# Patient Record
Sex: FEMALE | Race: WHITE
[De-identification: names, ages, dates, MRNs, and addresses within clinical notes are randomized per-mention and may not be internally consistent; named-entity substitution may affect disease eponyms.]

---

## 2019-04-22 ENCOUNTER — HOSPITAL ENCOUNTER (EMERGENCY)
Dept: HOSPITAL 15 - ER | Age: 64
Discharge: HOME | End: 2019-04-22
Payer: COMMERCIAL

## 2019-04-22 VITALS — DIASTOLIC BLOOD PRESSURE: 63 MMHG | SYSTOLIC BLOOD PRESSURE: 125 MMHG

## 2019-04-22 VITALS — BODY MASS INDEX: 20.32 KG/M2 | WEIGHT: 119 LBS | HEIGHT: 64 IN

## 2019-04-22 DIAGNOSIS — N20.0: Primary | ICD-10-CM

## 2019-04-22 LAB
ALBUMIN SERPL-MCNC: 3.6 G/DL (ref 3.4–5)
ALP SERPL-CCNC: 92 U/L (ref 45–117)
ALT SERPL-CCNC: 23 U/L (ref 13–56)
ANION GAP SERPL CALCULATED.3IONS-SCNC: 10 MMOL/L (ref 5–15)
BILIRUB SERPL-MCNC: 0.1 MG/DL (ref 0.2–1)
BUN SERPL-MCNC: 22 MG/DL (ref 7–18)
BUN/CREAT SERPL: 26.5
CALCIUM SERPL-MCNC: 8.5 MG/DL (ref 8.5–10.1)
CHLORIDE SERPL-SCNC: 110 MMOL/L (ref 98–107)
CO2 SERPL-SCNC: 22 MMOL/L (ref 21–32)
GLUCOSE SERPL-MCNC: 103 MG/DL (ref 74–106)
HCT VFR BLD AUTO: 35.7 % (ref 36–46)
HGB BLD-MCNC: 12 G/DL (ref 12.2–16.2)
MCH RBC QN AUTO: 30.4 PG (ref 28–32)
MCV RBC AUTO: 90.6 FL (ref 80–100)
NRBC BLD QL AUTO: 0 %
POTASSIUM SERPL-SCNC: 3.9 MMOL/L (ref 3.5–5.1)
PROT SERPL-MCNC: 6.3 G/DL (ref 6.4–8.2)
SODIUM SERPL-SCNC: 142 MMOL/L (ref 136–145)

## 2019-04-22 PROCEDURE — 96374 THER/PROPH/DIAG INJ IV PUSH: CPT

## 2019-04-22 PROCEDURE — 85025 COMPLETE CBC W/AUTO DIFF WBC: CPT

## 2019-04-22 PROCEDURE — 96361 HYDRATE IV INFUSION ADD-ON: CPT

## 2019-04-22 PROCEDURE — 96375 TX/PRO/DX INJ NEW DRUG ADDON: CPT

## 2019-04-22 PROCEDURE — 74176 CT ABD & PELVIS W/O CONTRAST: CPT

## 2019-04-22 PROCEDURE — 81001 URINALYSIS AUTO W/SCOPE: CPT

## 2019-04-22 PROCEDURE — 99284 EMERGENCY DEPT VISIT MOD MDM: CPT

## 2019-04-22 PROCEDURE — 80053 COMPREHEN METABOLIC PANEL: CPT

## 2019-04-22 PROCEDURE — 36415 COLL VENOUS BLD VENIPUNCTURE: CPT

## 2024-12-30 ENCOUNTER — HOSPITAL ENCOUNTER (EMERGENCY)
Dept: HOSPITAL 15 - ER | Age: 69
Discharge: LEFT BEFORE DISCHARGE | End: 2024-12-30
Payer: COMMERCIAL

## 2024-12-30 VITALS — HEIGHT: 64 IN | BODY MASS INDEX: 26.35 KG/M2 | WEIGHT: 154.32 LBS

## 2024-12-30 VITALS — RESPIRATION RATE: 17 BRPM | OXYGEN SATURATION: 97 %

## 2024-12-30 VITALS — HEART RATE: 56 BPM

## 2024-12-30 VITALS — DIASTOLIC BLOOD PRESSURE: 54 MMHG | SYSTOLIC BLOOD PRESSURE: 75 MMHG | TEMPERATURE: 98 F

## 2024-12-30 DIAGNOSIS — E03.9: ICD-10-CM

## 2024-12-30 DIAGNOSIS — K29.70: Primary | ICD-10-CM

## 2024-12-30 LAB
ANION GAP SERPL CALCULATED.3IONS-SCNC: 8 MMOL/L (ref 5–15)
BUN SERPL-MCNC: 18 MG/DL (ref 9–23)
BUN/CREAT SERPL: 17.8 (ref 10–20)
CALCIUM SERPL-MCNC: 10.3 MG/DL (ref 8.7–10.4)
CHLORIDE SERPL-SCNC: 107 MMOL/L (ref 98–107)
CO2 SERPL-SCNC: 27 MMOL/L (ref 20–31)
GLUCOSE SERPL-MCNC: 116 MG/DL (ref 74–106)
HCT VFR BLD AUTO: 41.7 % (ref 36–46)
HGB BLD-MCNC: 14.2 G/DL (ref 12.2–16.2)
MCH RBC QN AUTO: 31.2 PG (ref 28–32)
MCV RBC AUTO: 91.8 FL (ref 80–100)
NRBC BLD QL AUTO: 0 %
POTASSIUM SERPL-SCNC: 3.5 MMOL/L (ref 3.5–5.1)
SODIUM SERPL-SCNC: 142 MMOL/L (ref 136–145)

## 2024-12-30 PROCEDURE — 36415 COLL VENOUS BLD VENIPUNCTURE: CPT

## 2024-12-30 PROCEDURE — 80048 BASIC METABOLIC PNL TOTAL CA: CPT

## 2024-12-30 PROCEDURE — 85025 COMPLETE CBC W/AUTO DIFF WBC: CPT

## 2024-12-30 PROCEDURE — 93005 ELECTROCARDIOGRAM TRACING: CPT

## 2024-12-30 NOTE — ECG
[facility]

                                       

Test Date:    2024               Test Time:    08:34:55

Pat Name:     ROMAN KEITH            Department:   ER

Patient ID:   [id number]           Room:          

Gender:       F                        Technician:   GP

:          1955               Requested By: DARRIAN ULRICH

Order Number: [id number]        Reading MD:   Naila Obando

                                 Measurements

Intervals                              Axis          

Rate:         56                       P:            81

IN:           147                      QRS:          68

QRSD:         98                       T:            68

QT:           434                                    

QTc:          419                                    

                           Interpretive Statements

Sinus rhythm

RSR' in V1 or V2, right VCD or RVH

Electronically Signed On 2024 9:05:58 PST by Naila Obando



Please click the below link to view image of tracing.

## 2024-12-30 NOTE — ED.PDOC
History of Present Illness


HPI Comments


70 y/o F, with a Hx of cholelithiasis and thyroid disease, is BIBA for c/o 

abdominal pain, nausea, and vomiting, today. Patient endorses on sudden and 

unprovoked onset of 7/10 pain with other symptoms at 0200, this morning. She 

reports another isolated episode of symptoms that resolved on its own 1x week 

ago and states on suspicion on her "gallstones" acting up, again. She denies any

diarrhea, hematemesis, fever, chills, or other associated symptoms or modifiers 

at this time.


Chief Complaint:  Abdominal Pain


Time Seen by MD:  10:45


Primary Care Provider:  JOSE HINOJOSA


Reviewed Notes:  Nurses Notes, Paramedic Notes, Medications, Allergies


Allergies:  


Coded Allergies:  


     NO KNOWN ALLERGIES (Unverified , 4/22/19)


Information Source:  Patient, Emergency Med Personnel


Mode of Arrival:  EMS


Severity:  Moderate


Duration:  Since onset


Prehospital treatment:  12 Lead EKG, Cardiac Monitor





Past Medical History


PAST MEDICAL HISTORY:  Anxiety, Depression, Gallstones, Thyroid


Surgical History:  Denies all surgeries


GYN History:  No Pertinent GYN History





Family History


Family History:  Unknown





Social History


Smoker:  Non-Smoker


Alcohol:  Denies ETOH Use


Drugs:  Denies Drug Use


Lives In:  Home





Gastrointestinal:  reports: abdominal pain, nausea, vomiting


All Other Systems:  Reviewed and Negative (negative unless otherwise stated 

above or in HPI)





Physical Exam


General Appearance:  Moderate Distress


HEENT:  Normal ENT Inspection, Pharynx Normal, TMs Normal


Neck:  Full Range of Motion, Non-Tender, Normal, Normal Inspection


Respiratory:  Chest Non-Tender, Lungs Clear, No Accessory Muscle Use, No 

Respiratory Distress, Normal Breath Sounds


Cardiovascular:  No Edema, No JVD, No Murmur, No Gallop, Normal Peripheral 

Pulses, Regular Rate/Rhythm


Breast Exam:  Deferred


Gastrointestinal:  No Organomegaly, Non Tender, No Pulsatile Mass, Normal Bowel 

Sounds, Soft


Genitalia:  Deferred


Pelvic:  Deferred


Rectal:  Deferred


Extremities:  No calf tenderness, Normal capillary refill, Normal inspection, 

Normal range of motion, Non-tender, No pedal edema


Musculoskeletal :  


   Apperance:  Normal


Neurologic:  Alert, CNs II-XII nml as Tested, No Motor Deficits, Normal Affect, 

Normal Mood, No Sensory Deficits


Cerebellar Function:  Normal


Reflexes:  Normal


Skin:  Dry, Normal Color, Warm


Peripheral Pulses:  3+ Radial (R), 3+ Radial (L)


Lymphatic:  No Adenopathy





Was a procedure done?


Was a procedure done?:  No





EKG


EKG :  


   Pulse Rate (adult):  56


   Axis:  Normal


   Cardiac Rhythm:  NSR


   Block:  None


   Hypertrophy:  None


   ST:  Normal





Differential Dx


Considerations may include:


cholelithiasis, cholecystitis, gastritis, gastroenteritis, spoiled food, viral 

syndrome, acute abdomen





X-Ray, Labs, Meds, VS





                                   Vital Signs








  Date Time  Temp Pulse Resp B/P (MAP) Pulse Ox O2 Delivery O2 Flow Rate FiO2


 


12/30/24 09:35  56      


 


12/30/24 09:21   17  97 Room Air* 0 21


 


12/30/24 09:18 98.0 76 17 75/54 (61) 97   





 98.0       


 


12/30/24 08:34  56      


 


12/30/24 08:30 98.0 62 14 105/73 (84) 100   








                                       Lab








Test


 12/30/24


08:50 Range/Units


 


 


White Blood Count


 9.7 


 4.4-10.8


10^3/uL


 


Red Blood Count


 4.54 


 4.0-5.20


10^6/uL


 


Hemoglobin 14.2  12.2-16.2  g/dL


 


Hematocrit 41.7  36.0-46.0  %


 


Mean Corpuscular Volume 91.8  80.0-100.0  fL


 


Mean Corpuscular Hemoglobin 31.2  28.0-32.0  pg


 


Mean Corpuscular Hemoglobin


Concent 34.0 


 32.0-36.0  g/dL





 


Red Cell Distribution Width 12.7  11.8-14.3  %


 


Platelet Count


 274 


 140-450


10^3/uL


 


Mean Platelet Volume 8.1  6.9-10.8  fL


 


Neutrophils (%) (Auto) 84.9 H 37.0-80.0  %


 


Lymphocytes (%) (Auto) 8.4 L 10.0-50.0  %


 


Monocytes (%) (Auto) 5.2  0.0-12.0  %


 


Eosinophils (%) (Auto) 1.3  0.0-7.0  %


 


Basophils (%) (Auto) 0.2  0.0-2.0  %


 


Neutrophils # (Auto)


 8.3 


 1.6-8.6  10


^3/uL


 


Lymphocytes # (Auto)


 0.8 


 0.4-5.4  10


^3/uL


 


Monocytes # (Auto) 0.5  0-1.3  10 ^3/uL


 


Eosinophils # (Auto) 0.1  0-0.8  10 ^3/uL


 


Basophils # (Auto) 0  0-0.2  10 ^3/uL


 


Nucleated Red Blood Cells 0.0   %


 


Sodium Level 142  136-145  mmol/L


 


Potassium Level 3.5  3.5-5.1  mmol/L


 


Chloride Level 107    mmol/L


 


Carbon Dioxide Level 27  20-31  mmol/L


 


Anion Gap 8  5-15  


 


Blood Urea Nitrogen 18  9-23  mg/dL


 


Creatinine


 1.01 


 0.550-1.02


mg/dL


 


Glomerular Filtration Rate


Calc 60 


 >90  mL/min





 


BUN/Creatinine Ratio 17.8  10.0-20.0  


 


Serum Glucose 116 H   mg/dL


 


Calcium Level 10.3  8.7-10.4  mg/dL





Patient alert.  


Complaining of abdominal discomfort.  


Vitals stable.  


Answering all questions.


WBC within normal limits.  


Hemoglobin within normal limits.  


Establish intravenous access.  


Was given fluids.


Pink color.  


No sign of distress.  


Was given Zofran.  


Reviewed her history.  


Explained to the patient that she will possibly need gallbladder workup.  


Was told to follow up with her primary care physician.  


Was told to come back if there is any problem.


Time of 1ST Reevaluation:  11:15


Reevaluation 1ST:  Improved


Patient Education/Counseling:  Diagnosis, Treatment


Family Education/Counseling:  No Family Present


Additional Information


I reviewed the following notes from patient's past medical encounters: ER 

physician documentation on 04/24/2019





The following tests were ordered, and results were reviewed by me: BMP, CBC, UA,

EKG





Additional Information was gathered from interviewing the following independent 

historians: EMT





I discussed treatment and results with medical personnel





Departure 1


Departure


Time of Disposition:  12:13


Impression:  


   Primary Impression:  


   Gastritis


   Qualified Codes:  K29.00 - Acute gastritis without bleeding


Disposition:  01 HOME / SELF CARE / HOMELESS


Condition:  Good


Discharged With:  Self





Critical Care Note


Critical Care Time?:  No





Stability


Stability form required:  No





Heart Score


Heart Score:  








Heart Score Response (Comments) Value


 


History N/A 0


 


EKG N/A 0


 


Age N/A 0


 


Risk Factors N/A 0


 


Troponin N/A 0


 


Total  0














I personally scribed for DARRIAN ULRICH MD ([email or web address]) on 12/30/24 at 09:35.  

Electronically submitted by [place]oac[place] Ann ([email or web address]).


I personally scribed for DARRIAN ULRICH MD ([email or web address]) on 12/30/24 at 11:01.  

Electronically submitted by [place] Ann ([email or web address]).





DARRIAN ULRICH MD           Dec 30, 2024 09:35